# Patient Record
Sex: MALE | Race: BLACK OR AFRICAN AMERICAN
[De-identification: names, ages, dates, MRNs, and addresses within clinical notes are randomized per-mention and may not be internally consistent; named-entity substitution may affect disease eponyms.]

---

## 2018-04-27 ENCOUNTER — HOSPITAL ENCOUNTER (OUTPATIENT)
Dept: HOSPITAL 72 - SUR | Age: 58
Discharge: HOME | End: 2018-04-27
Payer: COMMERCIAL

## 2018-04-27 VITALS — DIASTOLIC BLOOD PRESSURE: 76 MMHG | SYSTOLIC BLOOD PRESSURE: 133 MMHG

## 2018-04-27 VITALS — BODY MASS INDEX: 32.93 KG/M2 | WEIGHT: 230 LBS | HEIGHT: 70 IN

## 2018-04-27 VITALS — SYSTOLIC BLOOD PRESSURE: 136 MMHG | DIASTOLIC BLOOD PRESSURE: 75 MMHG

## 2018-04-27 VITALS — DIASTOLIC BLOOD PRESSURE: 75 MMHG | SYSTOLIC BLOOD PRESSURE: 131 MMHG

## 2018-04-27 VITALS — DIASTOLIC BLOOD PRESSURE: 63 MMHG | SYSTOLIC BLOOD PRESSURE: 127 MMHG

## 2018-04-27 VITALS — DIASTOLIC BLOOD PRESSURE: 97 MMHG | SYSTOLIC BLOOD PRESSURE: 152 MMHG

## 2018-04-27 VITALS — DIASTOLIC BLOOD PRESSURE: 81 MMHG | SYSTOLIC BLOOD PRESSURE: 132 MMHG

## 2018-04-27 VITALS — DIASTOLIC BLOOD PRESSURE: 72 MMHG | SYSTOLIC BLOOD PRESSURE: 147 MMHG

## 2018-04-27 VITALS — DIASTOLIC BLOOD PRESSURE: 56 MMHG | SYSTOLIC BLOOD PRESSURE: 133 MMHG

## 2018-04-27 VITALS — DIASTOLIC BLOOD PRESSURE: 91 MMHG | SYSTOLIC BLOOD PRESSURE: 138 MMHG

## 2018-04-27 VITALS — DIASTOLIC BLOOD PRESSURE: 92 MMHG | SYSTOLIC BLOOD PRESSURE: 153 MMHG

## 2018-04-27 VITALS — DIASTOLIC BLOOD PRESSURE: 72 MMHG | SYSTOLIC BLOOD PRESSURE: 142 MMHG

## 2018-04-27 VITALS — DIASTOLIC BLOOD PRESSURE: 88 MMHG | SYSTOLIC BLOOD PRESSURE: 144 MMHG

## 2018-04-27 DIAGNOSIS — G47.33: ICD-10-CM

## 2018-04-27 DIAGNOSIS — K21.9: ICD-10-CM

## 2018-04-27 DIAGNOSIS — G56.03: Primary | ICD-10-CM

## 2018-04-27 DIAGNOSIS — N18.9: ICD-10-CM

## 2018-04-27 PROCEDURE — 64721 CARPAL TUNNEL SURGERY: CPT

## 2018-04-27 PROCEDURE — 82962 GLUCOSE BLOOD TEST: CPT

## 2018-04-27 PROCEDURE — 26145 TENDON EXCISION PALM/FINGER: CPT

## 2018-04-27 NOTE — IMMEDIATE POST-OP EVALUATION
Immediate Post-Op Evalulation


Immediate Post-Op Evalulation


Procedure:  Blateral CT release


Date of Evaluation:  Apr 27, 2018


Time of Evaluation:  08:40


IV Fluids:  1000


Blood Products:  none


Estimated Blood Loss:  min


Urinary Output:  none


Blood Pressure Systolic:  116


Blood Pressure Diastolic:  54


Pulse Rate:  72


Respiratory Rate:  20


O2 Sat by Pulse Oximetry:  99


Temperature (Fahrenheit):  97.6


Pain Score (1-10):  2


Nausea:  No


Vomiting:  No


Complications


none


Patient Status:  awake, patent, none


Hydration Status:  adequate











YURY ZAYAS M.D. Apr 27, 2018 11:16

## 2018-04-27 NOTE — 48 HOUR POST ANESTHESIA EVAL
Post Anesthesia Evaluation


Procedure:  Blateral CT release


Date of Evaluation:  Apr 27, 2018


Time of Evaluation:  11:16


Blood Pressure Systolic:  136


0:  75


Pulse Rate:  68


Temperature (Fahrenheit):  97.8


O2 Sat by Pulse Oximetry:  98


Airway:  patent


Nausea:  No


Vomiting:  No


Pain Intensity:  2


Hydration Status:  adequate


Cardiopulmonary Status:


stable


Mental Status/LOC:  patient returned to baseline


Follow-up Care/Observations:


n/a


Post-Anesthesia Complications:


none


Follow-up care needed:  ready to discharge











YURY ZAYAS M.D. Apr 27, 2018 11:17

## 2018-04-27 NOTE — OPERATIVE NOTE - PDOC
Operative Note


Operative Note


Pre-op Diagnosis:


right and left carpal tunnel release


Procedure:


see op report


Post-op Diagnosis:  same as pre-op plus


Operative Findings:  consistent w/pre-op dx studies


Anesthesia:  MAC


Specimen:  none


Complications:  none


Condition:  stable


Estimated Blood Loss:  none


Implant(s) used?:  No











MILAGROS ALANIZ Apr 27, 2018 07:40

## 2018-04-27 NOTE — OPERATIVE NOTE - DICTATED
DATE OF OPERATION:  04/27/2018



PREOPERATIVE DIAGNOSES:

1. Right carpal tunnel syndrome.

2. Left carpal tunnel syndrome.



POSTOPERATIVE DIAGNOSES:

1. Right carpal tunnel syndrome.

2. Left carpal tunnel syndrome.



PROCEDURES:

1. Right median nerve decompression (carpal tunnel release).

2. Synovectomy, right flexor tendon.

3. Left median nerve decompression surgery (carpal tunnel release).

4. Synovectomy of flexor tendon, left wrist.



SURGEON:  Fab Leyva M.D.



ANESTHESIA:  MAC.



INDICATION FOR PROCEDURE:  The patient is a pleasant gentleman, who has had

bilateral numbness and tingling.  He had an EMG which showed

moderate-to-severe carpal tunnel syndrome.  He failed conservative

treatment and elected to undergo bilateral carpal tunnel release.  Risks,

limitations, expectations, and complications of procedure were discussed

in detail including continued numbness or tingling, risk of nerve damage,

infection, risk of anesthesia, etc.  All questions were addressed.



DESCRIPTION OF PROCEDURE:  After informed consent was obtained, the patient

was brought to the operating room.  We placed the patient under monitored

anesthesia control.  Tourniquet was applied to the right proximal arm.

Right arm was prepped and draped in sterile manner.  Time-out was

performed.  The skin was marked out.  Esmarch was used to exsanguinate the

extremity.  Skin was incised.  Subcutaneous fat was dissected out.

Palmaris fascia was identified and incised.  Transverse carpal was

identified and using a scalpel, an entry point carpal tunnel was

performed.  Once the carpal tunnel was entered, using blunt-tipped

tenotomy scissors, the transverse carpal tunnel was released proximally

and distally.



At this point, the tenosynovectomy of the flexor tendon was completed to

debulk the carpal tunnel.  Once this was completed, subcutaneous injection

containing Kenalog, Marcaine, Toradol, Duramorph was injected.  The skin

was approximated with 4-0 nylon sutures.  Compression dressing was

applied.



Attention was turned towards the left wrist.  Similarly, the skin was

marked out.  The skin was incised.  Dissection down to the transverse

carpal tunnel was performed.  The transverse carpal tunnel was incised and

using blunt-tipped tenotomy, it was released proximally and distally under

direct visualization.  Once that was completed, tenosynovectomy of flexor

tendon was completed.  At that point, again an injection containing 0.25%

Marcaine with epinephrine, 40 mg of Kenalog, 15 mg Toradol, and 5 mL of

Marcaine was injected.  The skin was approximated with 4-0 nylon sutures.

Compression dressing was applied.  The patient was awoken and taken to

recovery room with stable vital signs.



ESTIMATED BLOOD LOSS:  None.



COMPLICATIONS:  None.



SPECIMENS:  None.



IMPLANTS:  None.









  ______________________________________________

  Fab Leyva M.D.





DR:  Iker

D:  04/27/2018 10:13

T:  04/27/2018 15:40

JOB#:  [id number]

CC:



BARBARA

## 2018-04-27 NOTE — ANETHESIA PREOPERATIVE EVAL
Anesthesia Pre-op PMH/ROS


General


Date of Evaluation:  Apr 27, 2018


Time of Evaluation:  07:16


Anesthesiologist:  Harper


ASA Score:  ASA 2


Mallampati Score


Class I : Soft palate, uvula, fauces, pillars visible


Class II: Soft palate, uvula, fauces visible


Class III: Soft palate, base of uvula visible


Class IV: Only hard plate visible


Mallampati Classification:  Class III


Surgeon:  Gordon


Diagnosis:  Bilateral CTS


Surgical Procedure:  Bilateral CT release


Anesthesia History:  none


Family History:  no anesthesia problems


Allergies:  


Coded Allergies:  


     No Known Allergies (Unverified , 4/26/18)


Medications:  see eMAR





Past Medical History


Cardiovascular:  Denies: HTN, CAD, MI, valve dz, arrhythmia, other


Pulmonary:  Reports: NINO; 


   Denies: asthma, COPD, other


Gastrointestinal/Genitourinary:  Reports: GERD, CRI - Elevated Cr.; 


   Denies: ESRD, other


Neurologic/Psychiatric:  Denies: dementia, CVA, depression/anxiety, TIA, other


Endocrine:  Denies: DM, hypothyroidism, steroids, other


HEENT:  Denies: cataract (L), cataract (R), glaucoma, [place] (L), [place] (R), other


Hematology/Immune:  Denies: anemia, DVT, bleeding disorder, other


Musculoskeletal/Integumentary:  Denies: OA, RA, DJD, DDD, edema, other


Other:  other


PMH Narrative:


as above


PSxH Narrative:


Knee scope





Anesthesia Pre-op Phys. Exam


Physician Exam





Last Vital Signs








  Date Time  Temp Pulse Resp B/P (MAP) Pulse Ox O2 Delivery O2 Flow Rate FiO2


 


4/27/18 05:57 97.2 71 20 132/81 97 Room Air  





 97.2       








Constitutional:  NAD


Neurologic:  CN 2-12 intact


Cardiovascular:  RRR, no M/R/G


Respiratory:  CTA


Gastrointestinal:  S/NT/ND





Airway Exam


Mallampati Score:  Class II


MO:  full


Neck:  Short


ROM:  full


Teeth:  intact


Dentures:  no upper, no lower





Anesthesia Pre-op A/P


Labs


see chart


Accucheck


preoperatively 98.





Studies


Pre-op Studies:  EKG - NSR





Risk Assessment & Plan


Assessment:


ASA 2


Plan:


GA with LMA


Status Change Before Surgery:  No





Pre-Antibiotics


Drug:  Ancef 2gr


Given Within 1 Hr of Incision:  Yes


Time Given:  07:28











YURY ZAYAS M.D. Apr 27, 2018 07:21

## 2018-04-27 NOTE — PRE-PROCEDURE NOTE/ATTESTATION
Pre-Procedure Note/Attestation


Complete Prior to Procedure


Planned Procedure:  right


Procedure Narrative:


carpel tunnel release





Indications for Procedure


Pre-Operative Diagnosis:


right and left carpal tunnel release





Attestation


I attest that I discussed the nature of the procedure; its benefits; risks and 

complications; and alternatives (and the risks and benefits of such alternatives

), prior to the procedure, with the patient (or the patient's legal 

representative).





I attest that, if there was a reasonable possibility of needing a blood 

transfusion, the patient (or the patient's legal representative) was given the 

[facility] standardized written summary, pursuant 

to the [place] Blood Safety Act ([place] Health and Safety Code # 1645, as 

amended).





I attest that I re-evaluated the patient just prior to the surgery and that 

there has been no change in the patient's H&P, except as documented below:











MILAGROS ALANIZ Apr 27, 2018 07:39